# Patient Record
Sex: FEMALE | Race: BLACK OR AFRICAN AMERICAN | NOT HISPANIC OR LATINO | URBAN - METROPOLITAN AREA
[De-identification: names, ages, dates, MRNs, and addresses within clinical notes are randomized per-mention and may not be internally consistent; named-entity substitution may affect disease eponyms.]

---

## 2017-03-29 ENCOUNTER — EMERGENCY (EMERGENCY)
Facility: HOSPITAL | Age: 62
LOS: 1 days | Discharge: HOME | End: 2017-03-29
Attending: EMERGENCY MEDICINE | Admitting: EMERGENCY MEDICINE
Payer: COMMERCIAL

## 2017-03-29 VITALS
TEMPERATURE: 98 F | HEART RATE: 75 BPM | RESPIRATION RATE: 16 BRPM | SYSTOLIC BLOOD PRESSURE: 198 MMHG | OXYGEN SATURATION: 99 % | DIASTOLIC BLOOD PRESSURE: 118 MMHG

## 2017-03-29 DIAGNOSIS — I10 ESSENTIAL (PRIMARY) HYPERTENSION: ICD-10-CM

## 2017-03-29 LAB
ALBUMIN SERPL ELPH-MCNC: 4.3 G/DL — SIGNIFICANT CHANGE UP (ref 3.3–5)
ALP SERPL-CCNC: 69 U/L — SIGNIFICANT CHANGE UP (ref 40–120)
ALT FLD-CCNC: 19 U/L RC — SIGNIFICANT CHANGE UP (ref 10–45)
ANION GAP SERPL CALC-SCNC: 17 MMOL/L — SIGNIFICANT CHANGE UP (ref 5–17)
AST SERPL-CCNC: 46 U/L — HIGH (ref 10–40)
BASE EXCESS BLDV CALC-SCNC: 1.3 MMOL/L — SIGNIFICANT CHANGE UP (ref -2–2)
BILIRUB SERPL-MCNC: 0.3 MG/DL — SIGNIFICANT CHANGE UP (ref 0.2–1.2)
BUN SERPL-MCNC: 20 MG/DL — SIGNIFICANT CHANGE UP (ref 7–23)
CA-I SERPL-SCNC: 1.3 MMOL/L — SIGNIFICANT CHANGE UP (ref 1.12–1.3)
CALCIUM SERPL-MCNC: 10.1 MG/DL — SIGNIFICANT CHANGE UP (ref 8.4–10.5)
CHLORIDE BLDV-SCNC: 100 MMOL/L — SIGNIFICANT CHANGE UP (ref 96–108)
CHLORIDE SERPL-SCNC: 98 MMOL/L — SIGNIFICANT CHANGE UP (ref 96–108)
CO2 BLDV-SCNC: 27 MMOL/L — SIGNIFICANT CHANGE UP (ref 22–30)
CO2 SERPL-SCNC: 23 MMOL/L — SIGNIFICANT CHANGE UP (ref 22–31)
CREAT SERPL-MCNC: 1.48 MG/DL — HIGH (ref 0.5–1.3)
GAS PNL BLDV: 140 MMOL/L — SIGNIFICANT CHANGE UP (ref 136–145)
GAS PNL BLDV: SIGNIFICANT CHANGE UP
GAS PNL BLDV: SIGNIFICANT CHANGE UP
GLUCOSE BLDV-MCNC: 94 MG/DL — SIGNIFICANT CHANGE UP (ref 70–99)
GLUCOSE SERPL-MCNC: 98 MG/DL — SIGNIFICANT CHANGE UP (ref 70–99)
HCO3 BLDV-SCNC: 26 MMOL/L — SIGNIFICANT CHANGE UP (ref 21–29)
HCT VFR BLD CALC: 31.4 % — LOW (ref 34.5–45)
HCT VFR BLDA CALC: 21 % — CRITICAL LOW (ref 39–50)
HGB BLD CALC-MCNC: 6.8 G/DL — CRITICAL LOW (ref 11.5–15.5)
HGB BLD-MCNC: 10.3 G/DL — LOW (ref 11.5–15.5)
LACTATE BLDV-MCNC: 1.2 MMOL/L — SIGNIFICANT CHANGE UP (ref 0.7–2)
MCHC RBC-ENTMCNC: 27.2 PG — SIGNIFICANT CHANGE UP (ref 27–34)
MCHC RBC-ENTMCNC: 32.7 GM/DL — SIGNIFICANT CHANGE UP (ref 32–36)
MCV RBC AUTO: 83.3 FL — SIGNIFICANT CHANGE UP (ref 80–100)
PCO2 BLDV: 42 MMHG — SIGNIFICANT CHANGE UP (ref 35–50)
PH BLDV: 7.4 — SIGNIFICANT CHANGE UP (ref 7.35–7.45)
PLATELET # BLD AUTO: 276 K/UL — SIGNIFICANT CHANGE UP (ref 150–400)
PO2 BLDV: 34 MMHG — SIGNIFICANT CHANGE UP (ref 25–45)
POTASSIUM BLDV-SCNC: 3.6 MMOL/L — SIGNIFICANT CHANGE UP (ref 3.5–5)
POTASSIUM SERPL-MCNC: 5.1 MMOL/L — SIGNIFICANT CHANGE UP (ref 3.5–5.3)
POTASSIUM SERPL-SCNC: 5.1 MMOL/L — SIGNIFICANT CHANGE UP (ref 3.5–5.3)
PROT SERPL-MCNC: 7.9 G/DL — SIGNIFICANT CHANGE UP (ref 6–8.3)
RBC # BLD: 3.77 M/UL — LOW (ref 3.8–5.2)
RBC # FLD: 16.1 % — HIGH (ref 10.3–14.5)
SAO2 % BLDV: 64 % — LOW (ref 67–88)
SODIUM SERPL-SCNC: 138 MMOL/L — SIGNIFICANT CHANGE UP (ref 135–145)
WBC # BLD: 5.8 K/UL — SIGNIFICANT CHANGE UP (ref 3.8–10.5)
WBC # FLD AUTO: 5.8 K/UL — SIGNIFICANT CHANGE UP (ref 3.8–10.5)

## 2017-03-29 PROCEDURE — 93010 ELECTROCARDIOGRAM REPORT: CPT

## 2017-03-29 PROCEDURE — 82435 ASSAY OF BLOOD CHLORIDE: CPT

## 2017-03-29 PROCEDURE — 99284 EMERGENCY DEPT VISIT MOD MDM: CPT | Mod: 25

## 2017-03-29 PROCEDURE — 93005 ELECTROCARDIOGRAM TRACING: CPT

## 2017-03-29 PROCEDURE — 84132 ASSAY OF SERUM POTASSIUM: CPT

## 2017-03-29 PROCEDURE — 82330 ASSAY OF CALCIUM: CPT

## 2017-03-29 PROCEDURE — 80053 COMPREHEN METABOLIC PANEL: CPT

## 2017-03-29 PROCEDURE — 84295 ASSAY OF SERUM SODIUM: CPT

## 2017-03-29 PROCEDURE — 85610 PROTHROMBIN TIME: CPT

## 2017-03-29 PROCEDURE — 71020: CPT | Mod: 26

## 2017-03-29 PROCEDURE — 82803 BLOOD GASES ANY COMBINATION: CPT

## 2017-03-29 PROCEDURE — 83605 ASSAY OF LACTIC ACID: CPT

## 2017-03-29 PROCEDURE — 85027 COMPLETE CBC AUTOMATED: CPT

## 2017-03-29 PROCEDURE — 85014 HEMATOCRIT: CPT

## 2017-03-29 PROCEDURE — 71046 X-RAY EXAM CHEST 2 VIEWS: CPT

## 2017-03-29 PROCEDURE — 85730 THROMBOPLASTIN TIME PARTIAL: CPT

## 2017-03-29 PROCEDURE — 82947 ASSAY GLUCOSE BLOOD QUANT: CPT

## 2017-03-29 RX ORDER — AMLODIPINE BESYLATE 2.5 MG/1
5 TABLET ORAL ONCE
Qty: 0 | Refills: 0 | Status: COMPLETED | OUTPATIENT
Start: 2017-03-29 | End: 2017-03-29

## 2017-03-29 RX ORDER — METOPROLOL TARTRATE 50 MG
25 TABLET ORAL
Qty: 0 | Refills: 0 | Status: DISCONTINUED | OUTPATIENT
Start: 2017-03-29 | End: 2017-04-02

## 2017-03-29 RX ADMIN — AMLODIPINE BESYLATE 5 MILLIGRAM(S): 2.5 TABLET ORAL at 22:53

## 2017-03-29 RX ADMIN — Medication 25 MILLIGRAM(S): at 23:33

## 2017-03-29 NOTE — ED ADULT NURSE NOTE - PMH
Anxiety    Depression    Hypertension    Morbid obesity    OA (osteoarthritis) of knee    SLE (systemic lupus erythematosus)

## 2017-03-29 NOTE — ED ADULT NURSE REASSESSMENT NOTE - NS ED NURSE REASSESS COMMENT FT1
Patient denies any chest pain, dizziness, n/v/d, SOB; ED MD Jelani Israel at bedside; a&ox3; safety and comfort measures provided Patient denies any chest pain, dizziness, n/v/d, SOB; ED MD Jelani Israel at bedside; ED MD aware of BP; no interventions at this time; a&ox3; safety and comfort measures provided

## 2017-03-29 NOTE — ED ADULT NURSE NOTE - OBJECTIVE STATEMENT
61 y/o female presenting to the ED for body aches; Patient states has hx of lupus and feels "very lowsy"; Patient states "stopped taking my metoprolol and amlodipine this week due to not refilling prescription"; Upon arrival to ED patient very hypertensive; Patient denies chest pain, dizziness, n/v/d, SOB; Patient states mild headache; denies vision changes; Neuro grossly intact; a&ox3; safety and comfort measures provided; ED MD Carter at bedside; aware of bp; no intervention at this time 63 y/o female presenting to the ED via EMS from pcp for body aches; Patient states has hx of lupus and feels "very lowsy"; Patient states "stopped taking my metoprolol and amlodipine this week due to not refilling prescription"; Upon arrival to ED patient very hypertensive; Patient denies chest pain, dizziness, n/v/d, SOB; Patient states mild headache; denies vision changes; Neuro grossly intact; a&ox3; safety and comfort measures provided; ED MD Carter at bedside; aware of bp; no intervention at this time

## 2017-03-30 VITALS
DIASTOLIC BLOOD PRESSURE: 100 MMHG | HEART RATE: 67 BPM | SYSTOLIC BLOOD PRESSURE: 185 MMHG | RESPIRATION RATE: 16 BRPM | OXYGEN SATURATION: 100 % | TEMPERATURE: 98 F

## 2017-03-30 LAB
BASOPHILS # BLD AUTO: 0 K/UL — SIGNIFICANT CHANGE UP (ref 0–0.2)
BASOPHILS NFR BLD AUTO: 0.1 % — SIGNIFICANT CHANGE UP (ref 0–2)
EOSINOPHIL # BLD AUTO: 0.1 K/UL — SIGNIFICANT CHANGE UP (ref 0–0.5)
EOSINOPHIL NFR BLD AUTO: 1.3 % — SIGNIFICANT CHANGE UP (ref 0–6)
LYMPHOCYTES # BLD AUTO: 2.1 K/UL — SIGNIFICANT CHANGE UP (ref 1–3.3)
LYMPHOCYTES # BLD AUTO: 37 % — SIGNIFICANT CHANGE UP (ref 13–44)
MONOCYTES # BLD AUTO: 0.6 K/UL — SIGNIFICANT CHANGE UP (ref 0–0.9)
MONOCYTES NFR BLD AUTO: 11.1 % — SIGNIFICANT CHANGE UP (ref 2–14)
NEUTROPHILS # BLD AUTO: 2.9 K/UL — SIGNIFICANT CHANGE UP (ref 1.8–7.4)
NEUTROPHILS NFR BLD AUTO: 50.5 % — SIGNIFICANT CHANGE UP (ref 43–77)
PLAT MORPH BLD: NORMAL — SIGNIFICANT CHANGE UP
RBC BLD AUTO: NORMAL — SIGNIFICANT CHANGE UP

## 2017-03-30 NOTE — ED PROVIDER NOTE - ATTENDING CONTRIBUTION TO CARE
62yoF with hx HTN, SLE, anxiety not taking meds x 1 week with mild HA, no CP/SOB, neuro deficit.   On exam, well appearing Accompanied by daughters.   A: HTN, noncompliance  Will give BP meds, check labs, ekg, cxr, reassess. fu PMD. pt states insurance kicks in this weekend.

## 2017-03-30 NOTE — ED ADULT NURSE REASSESSMENT NOTE - NS ED NURSE REASSESS COMMENT FT1
ED MD Israel aware of patient's bp; No intervention at this time; patient okay to be discharged home; educated on importance of taking bp medications; patient denies chest pain, dizziness, weakness, SOB, N/V/D; a&ox3

## 2017-03-30 NOTE — ED PROVIDER NOTE - PROGRESS NOTE DETAILS
Pt /99, improved from triage. Denies CP/SOB, focal weakness. Pt has not been taking her BP meds. received dose here. states her insurance will kick in on saturday.

## 2017-03-30 NOTE — ED PROVIDER NOTE - OBJECTIVE STATEMENT
Kd Lindy DO: 62yoF with SLE, anxiety presents for gradual onset, nonexertional HA, malaise. Says stopped taking BP meds last week due to insurance issue. She states has come in before for similar complaints and " you guys always tell me I'm fine." Denies CP, SOB. no blurred vision.

## 2017-03-30 NOTE — ED PROVIDER NOTE - PSH
Gastric bypass status for obesity 2001    Hx of cholecystectomy 2009    S/P arthroscopic surgery of left knee 1999, 2000    S/P arthroscopic surgery of right knee 1998. 1999

## 2018-12-06 NOTE — ED ADULT NURSE NOTE - CHPI ED SYMPTOMS NEG
[facility]  Physical Therapy Initial Evaluation    Name: Riana Kay  Clinic Number: [id number]    Therapy Diagnosis:   Encounter Diagnosis   Name Primary?    Chronic pain of right knee      Physician: Christiana Cole MD    Physician Orders: PT Eval and Treat   Medical Diagnosis: M17.11 (ICD-10-CM) - Primary osteoarthritis of right knee  Evaluation Date: 12/6/2018  Authorization period Expiration: 10/29/2019  Plan of Care Certification Period: 1/31/2019    Visit #: 1/ Visits authorized: 1  Time In:  0902  Time Out: 0955  Total Billable Time: 53 minutes    Precautions: Standard  Subjective   Date of onset: 1-2 years  History of current condition - Riana reports chronic bilateral knee pain R>L for the last 1-2 years.  Patient reported that she the pain was of an insidious onset.  Patient reported that she thinks that she has had ~3 steroid injections that help a little bit.  Patient reports that she takes OTC medications for the knee pain that help a little bit.         Past Medical History:   Diagnosis Date    Anxiety     AR (allergic rhinitis)     Diabetes mellitus, type 2     Dizziness     DM (diabetes mellitus)     Fatty liver     GERD (gastroesophageal reflux disease)     HTN (hypertension)     Hyperlipidemia     Memory loss     Osteopenia     S/P total hysterectomy     Sleep apnea      Riana Kay  has a past surgical history that includes Cholecystectomy; Knee arthroscopy w/ debridement (4/11); Total abdominal hysterectomy w/ bilateral salpingoophorectomy; Tonsillectomy; Rotator cuff repair; Elbow surgery (Right, 7/16/15); Elbow surgery; Hysterectomy; COLONOSCOPY with Jacobo (N/A, 1/17/2018); INJECTION-STEROID-EPIDURAL-CERVICAL (N/A, 3/16/2015); EGD (ESOPHAGOGASTRODUODENOSCOPY) (N/A, 2/28/2014); and COLONOSCOPY (N/A, 10/4/2013).    Riana has a current medication list which includes the following prescription(s): azelastine, blood sugar  diagnostic, blood-glucose meter, blood-glucose meter,continuous, blood-glucose sensor, blood-glucose transmitter, bupropion, canagliflozin, cyanocobalamin, insulin aspart u-100, insulin detemir u-100, insulin glargine, pen needle, diabetic, lancets, losartan, ondansetron, pantoprazole, pen needle, diabetic, phenazopyridine, pyridoxine (vitamin b6), and sitagliptin, and the following Facility-Administered Medications: sodium hyaluronate (euflexxa).    Review of patient's allergies indicates:   Allergen Reactions    Iodinated contrast- oral and iv dye      Other reaction(s): Swelling  Other reaction(s): Rash    Macrobid  [nitrofurantoin monohyd/m-cryst]      Other reaction(s): Rash    Metformin      Other reaction(s): Rash    Penicillins      Other reaction(s): Rash    Promethazine      Other reaction(s): rash  Other reaction(s): Unknown    Sulfa (sulfonamide antibiotics)      Other reaction(s): Rash    Sulfamethoxazole-trimethoprim      Other reaction(s): Rash        Imaging, x-ray: see results in EMR    Prior Therapy: PT for L knee previously   Social History: Patient lives with their spouse in  1 story home with no stairs to enter.  Patient has a shower stall present in the bathroom.   Occupation: Disability due to R elbow injury  Prior Level of Function: Chronic condition that has been interfering with ADLs for 1-2 years.  Current Level of Function:  Limited tolerance for walking 5 minutes, step to gait pattern for stairs with more difficulty going down the stairs, unable to kneel, difficulty with dressing lower body (stockings/socks), minimal difficulty with going from sit to stand, intermittent difficulty with car transfers, difficulty with squatting, difficulty with heavy household activities, intermittent sleep disturbances, pain with prolonged standing    Pain:  Current 0/10, worst 7/10, best 0/10   Location: knee  bilateral  Description: Aching and Burning  Aggravating Factors: Standing, Walking,  Getting out of bed/chair and stairs  Easing Factors: pain medication and ice    Pts goals: Patient wants to be able to walk for longer periods of time.       Objective     Observation: Unremarkable    Posture: Forward head, rounded shoulders       Range of Motion:   Knee Left active Left Passive Right Active R passive   Flexion 0  n/a 110 124 p!   Extension 2 0 p! 128 132 p!         Lower Extremity Strength  Right LE  Left LE    Knee extension: 4-/5 Knee extension: 4/5    Knee flexion: 4-/5 p! Knee flexion: 4/5 p!   Hip flexion: 3+/5 p! Hip flexion: 4-/5 p!   Hip extension:  4-/5 Hip extension: 4-/5   Hip abduction: 4-/5 p! Hip abduction: 4/5 p!   Hip adduction: 3+/5 p! Hip adduction 3+/5 p!       Function:    - SLS R: fair with pain  - SLS L: good with pain  - Squat: DNT   - Sit <--> Stand: fair  - Bed Mobility: fair  -stair negotiation:        Joint Mobility: Decreased patellar mobility noted in all directions bilaterally with crepitus also present.  Pain in all directions on the R, pain with med/lat on the L     Palpation: TTP along anterior medial joint line, patellar tendon, and tibial tuberosity of the R knee, TTP along anteromedial joint line of L knee    Sensation: WFL    Gait Analysis:  Antalgic, decreased stance time on RLE    Flexibility: decreased hamstring and piriformis flexibility noted bilaterally    Edema:     Girth Measurement Joint line 5 cm below 10 cm above   Left 39 cm 34 cm 44cm   Right 39 cm 34 cm 44 cm           CMS Impairment/Limitation/Restriction for FOTO Intake Survey    Therapist reviewed FOTO scores for Riana Samuel Kay on 12/6/2018.   FOTO documents entered into [employer] - see Media section.    Limitation Score: 59%  Category: Body Position    Current : CK = at least 40% but < 60% impaired, limited or restricted  Goal: CK = at least 40% but < 60% impaired, limited or restricted     PT Evaluation Completed? Yes  Discussed Plan of Care with patient: Yes    TREATMENT   Treatment Time  "In: 0902  Treatment Time Out: 0955  Total Treatment time separate from Evaluation time:  26    Riana received therapeutic exercises to develop strength, endurance, ROM and flexibility for 26 minutes including:    Hamstring stretch with strap 3 x 30"  Prone quad stretch with strap 3 x 30"  Slantboard calf stretch 3 x 30"  SAQ 3 x 10  Bridges 3 x 10  Clamshells 2 x 10    Home Exercises and Patient Education Provided    Education provided re:   - progress towards goals   - role of therapy in multi - disciplinary team, goals for therapy  No spiritual or educational barriers to learning provided    Written Home Exercises Provided: see patient instructions.  Exercises were reviewed and Riana was able to demonstrate them prior to the end of the session.   Pt received a written copy of exercises to perform at home. Riana demonstrated good  understanding of the education provided.       Assessment     Riana is a 64 y.o. female referred to outpatient Physical Therapy with a medical diagnosis of primary osteoarthritis of right knee. Pt presents with limited R knee ROM, weakness of the RLE, and abnormal gait pattern.  Functionally, Riana has difficulty with stair negotiation, prolonged walking, dressing the lower body, standing, and squatting.    Pt prognosis is Good.   Pt will benefit from skilled outpatient Physical Therapy to address the deficits stated above and in the chart below, provide pt/family education, and to maximize pt's level of independence.     Plan of care discussed with patient: Yes  Pt's spiritual, cultural and educational needs considered and pt agreeable to plan of care and goals as stated below:     Anticipated Barriers for therapy: none    Medical Necessity is demonstrated by the following  History  Co-morbidities and personal factors that may impact the plan of care Examination  Body Structures and Functions, activity limitations and participation restrictions that may impact the plan of " care    Clinical Presentation   Co-morbidities:   anxiety, diabetes and HTN        Personal Factors:   no deficits Body Regions:   lower extremities    Body Systems:    gross symmetry  ROM  strength  balance  gait  transfers            Participation Restrictions:   none   Activity limitations:   Learning and applying knowledge  no deficits    General Tasks and Commands  no deficits    Communication  no deficits    Mobility  walking    Self care  dressing    Domestic Life  doing house work (cleaning house, washing dishes, laundry)    Interactions/Relationships  no deficits    Life Areas  no deficits    Community and Social Life  community life  recreation and leisure         stable and uncomplicated                      low   low  low Decision Making/ Complexity Score:  low         GOALS:   Short Term Goals:  4 weeks  1.  Riana will be independent with HEP.  2. Riana will report 3/10 pain at worst with prolonged standing.  3. Riana will demonstrate increased MMT to 4/5  to increase tolerance for ADL and stair negotiation.  4. Riana will be able to walk for 10-15 minutes without pain.    Long Term Goals: 8 weeks  1.Riana will report 1/10 pain at worst with prolonged standing.  2.Riana will be able to walk for 20-25 minutes without pain.  3.Riana will demonstrate increased MMT to 4+/5  to increase tolerance for ADL and squatting activities.  4. Riana will report at CJ level (20<40% impaired) on FOTO ankle score  to demonstrate increase in LE function.       Plan     Certification Period: 12/6/2018 to 1/31/2019.    Outpatient Physical Therapy 2 times weekly for 8 weeks to include the following interventions: Electrical Stimulation TENS, Gait Training, Manual Therapy, Moist Heat/ Ice, Neuromuscular Re-ed, Patient Education, Therapeutic Activites and Therapeutic Exercise.     Tran Paige, PT, DPT   no fever/no numbness